# Patient Record
Sex: FEMALE | Race: WHITE
[De-identification: names, ages, dates, MRNs, and addresses within clinical notes are randomized per-mention and may not be internally consistent; named-entity substitution may affect disease eponyms.]

---

## 2017-03-17 ENCOUNTER — HOSPITAL ENCOUNTER (OUTPATIENT)
Dept: HOSPITAL 45 - C.MAMM | Age: 52
Discharge: HOME | End: 2017-03-17
Attending: OBSTETRICS & GYNECOLOGY
Payer: COMMERCIAL

## 2017-03-17 DIAGNOSIS — Z12.31: Primary | ICD-10-CM

## 2017-03-17 NOTE — MAMMOGRAPHY REPORT
BILATERAL DIGITAL SCREENING MAMMOGRAM TOMOSYNTHESIS WITH CAD: 3/17/2017

CLINICAL HISTORY: Routine screening.  Patient has no complaints.  





TECHNIQUE:  Breast tomosynthesis in addition to standard 2D mammography was performed. Current study
 was also evaluated with a Computer Aided Detection (CAD) system.  



COMPARISON: Comparison is made to exams dated:  3/14/2016 mammogram, 3/5/2014 mammogram, 3/11/2015 m
ammogram, 2/27/2013 mammogram, 2/22/2012 mammogram, and 2/18/2011 mammogram - [facility].   



BREAST COMPOSITION:  The tissue of both breasts is heterogeneously dense, which may obscure small ma
sses.  



FINDINGS:  No suspicious masses, calcifications, or areas of architectural distortion are noted in e
ither breast. There has been no significant interval change compared to prior exams.  





IMPRESSION:  ACR BI-RADS CATEGORY 1: NEGATIVE

There is no mammographic evidence of malignancy. A 1 year screening mammogram is recommended.  The p
atient will receive written notification of the results.  





Approximately 10% of breast cancers are not detected with mammography. A negative mammographic repor
t should not delay biopsy if a clinically suggestive mass is present.



Sahara Melvin M.D.          

ah/:3/17/2017 14:01:36  



Imaging Technologist: Jazmin DUMONT(R)(M), [facility]

letter sent: Normal 1/2  

BI-RADS Code: ACR BI-RADS Category 1: Negative

## 2018-03-20 ENCOUNTER — HOSPITAL ENCOUNTER (OUTPATIENT)
Dept: HOSPITAL 45 - C.MAMM | Age: 53
Discharge: HOME | End: 2018-03-20
Attending: PHYSICIAN ASSISTANT
Payer: COMMERCIAL

## 2018-03-20 DIAGNOSIS — Z12.31: Primary | ICD-10-CM

## 2018-03-20 NOTE — MAMMOGRAPHY REPORT
BILATERAL DIGITAL SCREENING MAMMOGRAM TOMOSYNTHESIS WITH CAD: 3/20/2018

CLINICAL HISTORY: Routine screening.  Patient has no complaints.  





TECHNIQUE:  Breast tomosynthesis in addition to standard 2D mammography was performed. Current study 
was also evaluated with a Computer Aided Detection (CAD) system.  



COMPARISON: Comparison is made to exams dated:  3/17/2017 mammogram, 3/14/2016 mammogram, 3/11/2015 m
ammogram, 3/5/2014 mammogram, 2/27/2013 mammogram, and 2/22/2012 mammogram - [facility]
nter.   



BREAST COMPOSITION:  The tissue of both breasts is heterogeneously dense, which may obscure small mas
ses.  



FINDINGS:  No suspicious masses, calcifications, or areas of architectural distortion are noted in ei
ther breast. There has been no significant interval change compared to prior exams.  



IMPRESSION:  ACR BI-RADS CATEGORY 1: NEGATIVE

There is no mammographic evidence of malignancy. A 1 year screening mammogram is recommended.  The pa
tient will receive written notification of the results.  





Approximately 10% of breast cancers are not detected with mammography. A negative mammographic report
 should not delay biopsy if a clinically suggestive mass is present.



Sahara Melvin M.D.          

/:3/20/2018 10:13:02  



Imaging Technologist: Kianna DUMONT([place])([place]), [facility]

letter sent: Normal 1/2  

BI-RADS Code: ACR BI-RADS Category 1: Negative

## 2018-05-01 ENCOUNTER — HOSPITAL ENCOUNTER (EMERGENCY)
Dept: HOSPITAL 45 - C.EDB | Age: 53
LOS: 1 days | Discharge: HOME | End: 2018-05-02
Payer: COMMERCIAL

## 2018-05-01 VITALS
BODY MASS INDEX: 24.11 KG/M2 | WEIGHT: 132.72 LBS | HEIGHT: 62.01 IN | BODY MASS INDEX: 24.11 KG/M2 | WEIGHT: 132.72 LBS | HEIGHT: 62.01 IN

## 2018-05-01 VITALS — TEMPERATURE: 98.24 F

## 2018-05-01 DIAGNOSIS — R10.30: Primary | ICD-10-CM

## 2018-05-01 DIAGNOSIS — Z86.79: ICD-10-CM

## 2018-05-01 DIAGNOSIS — Z87.42: ICD-10-CM

## 2018-05-01 DIAGNOSIS — Z88.8: ICD-10-CM

## 2018-05-01 DIAGNOSIS — R10.815: ICD-10-CM

## 2018-05-01 LAB
ALBUMIN SERPL-MCNC: 4.2 GM/DL (ref 3.4–5)
ALP SERPL-CCNC: 59 U/L (ref 45–117)
ALT SERPL-CCNC: 22 U/L (ref 12–78)
AST SERPL-CCNC: 15 U/L (ref 15–37)
BASOPHILS # BLD: 0.02 K/UL (ref 0–0.2)
BASOPHILS NFR BLD: 0.3 %
BUN SERPL-MCNC: 21 MG/DL (ref 7–18)
CALCIUM SERPL-MCNC: 8.7 MG/DL (ref 8.5–10.1)
CO2 SERPL-SCNC: 30 MMOL/L (ref 21–32)
CREAT SERPL-MCNC: 0.92 MG/DL (ref 0.6–1.2)
EOS ABS #: 0.16 K/UL (ref 0–0.5)
EOSINOPHIL NFR BLD AUTO: 265 K/UL (ref 130–400)
GLUCOSE SERPL-MCNC: 94 MG/DL (ref 70–99)
HCT VFR BLD CALC: 37.1 % (ref 37–47)
HGB BLD-MCNC: 12.6 G/DL (ref 12–16)
IG#: 0.01 K/UL (ref 0–0.02)
IMM GRANULOCYTES NFR BLD AUTO: 36.3 %
LIPASE: 167 U/L (ref 73–393)
LYMPHOCYTES # BLD: 2.28 K/UL (ref 1.2–3.4)
MCH RBC QN AUTO: 31.5 PG (ref 25–34)
MCHC RBC AUTO-ENTMCNC: 34 G/DL (ref 32–36)
MCV RBC AUTO: 92.8 FL (ref 80–100)
MONO ABS #: 0.55 K/UL (ref 0.11–0.59)
MONOCYTES NFR BLD: 8.8 %
NEUT ABS #: 3.26 K/UL (ref 1.4–6.5)
NEUTROPHILS # BLD AUTO: 2.5 %
NEUTROPHILS NFR BLD AUTO: 51.9 %
PMV BLD AUTO: 9.2 FL (ref 7.4–10.4)
POTASSIUM SERPL-SCNC: 4.1 MMOL/L (ref 3.5–5.1)
PROT SERPL-MCNC: 7.5 GM/DL (ref 6.4–8.2)
RED CELL DISTRIBUTION WIDTH CV: 12.5 % (ref 11.5–14.5)
RED CELL DISTRIBUTION WIDTH SD: 42.9 FL (ref 36.4–46.3)
SODIUM SERPL-SCNC: 140 MMOL/L (ref 136–145)
WBC # BLD AUTO: 6.28 K/UL (ref 4.8–10.8)

## 2018-05-01 NOTE — DIAGNOSTIC IMAGING REPORT
ABDOMEN AND PELVIS CT WITH IV CONTRAST



CT DOSE: 260.83 mGy.cm



HISTORY: Acute right lower quadrant abdominal pain  abd pain, rlq tenderness



TECHNIQUE: Multiaxial CT images of the abdomen and pelvis were performed

following the use of intravenous contrast.  A dose lowering technique was

utilized adhering to the principles of ALARA.



COMPARISON STUDY: None.



FINDINGS: 

Linear subsegmental consolidative opacities of the right middle lobe and

inferior segment lingula suggest atelectasis or scarring. There is no

pneumatosis or pneumoperitoneum. The imaged inferior cardiac chambers are

unremarkable. Trace pericardial effusion. Coronary arterial calcifications are

noted.



Mildly contracted gallbladder. Liver, spleen, pancreas and adrenal glands are

within normal limits. Kidneys, ureters and bladder are unremarkable. Uterus and

adnexa are unremarkable. Mild mixed plaquing of the aorta without aneurysm. No

bulky adenopathy.



Small sliding-type hiatal hernia. No small bowel obstruction. Normal appendix is

seen on image 295 series 3. Mobile cecum is noted within the left lower quadrant

of the abdomen. Mild colonic diverticulosis without CT evidence of acute

diverticulitis. Moderate stool volume suggests constipation. Fecal debris is

also noted within the terminal ileum. Soft tissues are unremarkable. Bones

appear intact. Transitional lumbosacral anatomy with lumbarization of the S1

segment. Rudimentary disc space is seen at S1-S2.



IMPRESSION: 



1. No acute intra-abdominal or intrapelvic abnormality identified . Normal

appendix.

2. No bowel obstruction or focal bowel wall thickening.

3. Suggested constipation.

4. Transitional lumbosacral anatomy with lumbarization of the S1 segment.

5. Colonic diverticulosis without CT evidence of acute diverticulitis.







Electronically signed by:  Andrews Lopez M.D.

5/1/2018 10:40 PM



Dictated Date/Time:  5/1/2018 10:33 PM

## 2018-05-01 NOTE — EMERGENCY ROOM VISIT NOTE
History


Report prepared by Kareem:  Erik Springer


Under the Supervision of:  Dr. Dorothea Person D.O.


First contact with patient:  21:19


Chief Complaint:  ABDOMINAL PAIN


Stated Complaint:  STOMACH PAINS


Nursing Triage Summary:  


Patient ambulatory to triage with an upright and steady gait, states "I have 


stomach pain, epigastric, that started around 1630 this evening. It subsided a 


little. I ate dinner. The pain got really bad around 1915."





History of Present Illness


The patient is a 52 year old female who presents to the Emergency Room with 

complaints of waxing and waning abdominal pain starting around 1600 this 

afternoon which is improved with hunching over. The patient states that she had 

a bottle of hard cider and hummus chips, and then 30 minutes later she started 

to have central abdominal pain which worsened around 1900 after having dinner. 

The patient states that the pain does not radiate into her back, it does not 

really move anywhere, and she does not feel bloated or distended. She denies 

any nausea, vomiting, fevers, chills, diarrhea, bloody stools, black stools, 

urinary symptoms, recent change in activity, recent travel, recent medication 

changes, any sick contacts, and any change in her eating other than an apple 

today. The patient has a history of IBS and diverticulosis, and she states that 

she has no history of diabetes, hypertension, abdominal surgery, and any blood 

thinner use. The patient has a family history of colon cancer, and she had a 

colonoscopy a year ago. She additionally notes that she had a dull ache on the 

right side of her abdomen for the past week which was a possible muscle strain, 

though she states that this improved until today. The patient reports that she 

still has her appendix and her gall bladder.





   Source of History:  patient


   Onset:  1600


   Position:  abdomen


   Timing:  waxes/wanes


   Modifying Factors (Worsening):  other (hunching over)


   Associated Symptoms:  No fevers, No chills, No nausea, No vomiting, No 

diarrhea, No urinary symptoms





Review of Systems


See HPI for pertinent positives & negatives. A total of 10 systems reviewed and 

were otherwise negative.





Past Medical & Surgical


Medical Problems:


(1) Cardiac Murmurs Nec


(2) Menstrual Disorder Nos


(3) Urin Tract Infection Nos








Family History





No significant family history





Social History


Smoking Status:  Never Smoker


Alcohol Use:  occasionally


Marital Status:  


Housing Status:  lives with significant other





Current/Historical Medications


Scheduled


Calcium Phosphate-Cholecalcife (Caltrate Gummy Bites), 2 TABS PO DAILY


Coenzyme Q10 (Ubidecarenone) (Co Q 10), 100 MG PO DAILY


Dicyclomine Hcl (Bentyl), 20 MG PO TID


Fiber (Fiber Select Gummies), 1 TAB PO DAILY


Fish Oil (Cherokee-3), 1 CAP PO DAILY


Loratadine (Claritin), 10 MG PO DAILY


Probiotic Product (Probiotic), 1 TAB PO DAILY


Red Yeast Rice Extract (Red Yeast Rice), 600 MG PO DAILY





Allergies


Coded Allergies:  


     Terbinafine (Verified  Allergy, Intermediate, Hives, 5/1/18)





Physical Exam


Vital Signs











  Date Time  Temp Pulse Resp B/P (MAP) Pulse Ox O2 Delivery O2 Flow Rate FiO2


 


5/2/18 00:23  89 18 148/96 98   


 


5/1/18 23:59  89 18 148/96 98 Room Air  


 


5/1/18 21:55  87 18 147/98 98 Room Air  


 


5/1/18 20:42 36.8 89 20 152/97 98 Room Air  











Physical Exam


GENERAL: alert, well appearing, well nourished, no distress, non-toxic 


EYE EXAM: normal conjunctiva, PERRL and EOM's grossly intact


OROPHARYNX: no exudate, no erythema, lips, buccal mucosa, and tongue normal and 

mucous membranes are moist


NECK: supple, no nuchal rigidity, no adenopathy, non-tender


LUNGS: Clear to auscultation. Normal chest wall mechanics


HEART: no murmurs, S1 normal and S2 normal 


ABDOMEN: abdomen soft, central abdominal tenderness and right lower quadrant 

tenderness, normo-active bowel sounds, no masses, no rebound or guarding. 


BACK: Back is symmetrical on inspection and there is no deformity, no midline 

tenderness, no CVA tenderness. 


SKIN: no rashes and no bruising 


UPPER EXTREMITIES: upper extremities are grossly normal. 


LOWER EXTREMITIES: No pitting edema.


NEURO EXAM: Normal sensorium, cranial nerves II-XII grossly intact, normal 

speech,  no gross weakness of arms, no gross weakness of legs.





Medical Decision & Procedures


ER Provider


Diagnostic Interpretation:


Radiology results have been interpreted by the radiologist and reviewed by me.








ABDOMEN AND PELVIS CT WITH IV CONTRAST





CT DOSE: 260.83 mGy.cm





HISTORY: Acute right lower quadrant abdominal pain  abd pain, rlq tenderness





TECHNIQUE: Multiaxial CT images of the abdomen and pelvis were performed


following the use of intravenous contrast.  A dose lowering technique was


utilized adhering to the principles of ALARA.





COMPARISON STUDY: None.





FINDINGS: 


Linear subsegmental consolidative opacities of the right middle lobe and


inferior segment lingula suggest atelectasis or scarring. There is no


pneumatosis or pneumoperitoneum. The imaged inferior cardiac chambers are


unremarkable. Trace pericardial effusion. Coronary arterial calcifications are


noted.





Mildly contracted gallbladder. Liver, spleen, pancreas and adrenal glands are


within normal limits. Kidneys, ureters and bladder are unremarkable. Uterus and


adnexa are unremarkable. Mild mixed plaquing of the aorta without aneurysm. No


bulky adenopathy.





Small sliding-type hiatal hernia. No small bowel obstruction. Normal appendix is


seen on image 295 series 3. Mobile cecum is noted within the left lower quadrant


of the abdomen. Mild colonic diverticulosis without CT evidence of acute


diverticulitis. Moderate stool volume suggests constipation. Fecal debris is


also noted within the terminal ileum. Soft tissues are unremarkable. Bones


appear intact. Transitional lumbosacral anatomy with lumbarization of the S1


segment. Rudimentary disc space is seen at S1-S2.





IMPRESSION: 





1. No acute intra-abdominal or intrapelvic abnormality identified . Normal


appendix.


2. No bowel obstruction or focal bowel wall thickening.


3. Suggested constipation.


4. Transitional lumbosacral anatomy with lumbarization of the S1 segment.


5. Colonic diverticulosis without CT evidence of acute diverticulitis.





Electronically signed by:  Andrews Lopez M.D.


5/1/2018 10:40 PM





Dictated Date/Time:  5/1/2018 10:33 PM





Laboratory Results


5/1/18 21:20








Red Blood Count 4.00, Mean Corpuscular Volume 92.8, Mean Corpuscular Hemoglobin 

31.5, Mean Corpuscular Hemoglobin Concent 34.0, Mean Platelet Volume 9.2, 

Neutrophils (%) (Auto) 51.9, Lymphocytes (%) (Auto) 36.3, Monocytes (%) (Auto) 

8.8, Eosinophils (%) (Auto) 2.5, Basophils (%) (Auto) 0.3, Neutrophils # (Auto) 

3.26, Lymphocytes # (Auto) 2.28, Monocytes # (Auto) 0.55, Eosinophils # (Auto) 

0.16, Basophils # (Auto) 0.02





5/1/18 21:20

















Test


  5/1/18


21:20


 


White Blood Count


  6.28 K/uL


(4.8-10.8)


 


Red Blood Count


  4.00 M/uL


(4.2-5.4)


 


Hemoglobin


  12.6 g/dL


(12.0-16.0)


 


Hematocrit 37.1 % (37-47) 


 


Mean Corpuscular Volume


  92.8 fL


()


 


Mean Corpuscular Hemoglobin


  31.5 pg


(25-34)


 


Mean Corpuscular Hemoglobin


Concent 34.0 g/dl


(32-36)


 


Platelet Count


  265 K/uL


(130-400)


 


Mean Platelet Volume


  9.2 fL


(7.4-10.4)


 


Neutrophils (%) (Auto) 51.9 % 


 


Lymphocytes (%) (Auto) 36.3 % 


 


Monocytes (%) (Auto) 8.8 % 


 


Eosinophils (%) (Auto) 2.5 % 


 


Basophils (%) (Auto) 0.3 % 


 


Neutrophils # (Auto)


  3.26 K/uL


(1.4-6.5)


 


Lymphocytes # (Auto)


  2.28 K/uL


(1.2-3.4)


 


Monocytes # (Auto)


  0.55 K/uL


(0.11-0.59)


 


Eosinophils # (Auto)


  0.16 K/uL


(0-0.5)


 


Basophils # (Auto)


  0.02 K/uL


(0-0.2)


 


RDW Standard Deviation


  42.9 fL


(36.4-46.3)


 


RDW Coefficient of Variation


  12.5 %


(11.5-14.5)


 


Immature Granulocyte % (Auto) 0.2 % 


 


Immature Granulocyte # (Auto)


  0.01 K/uL


(0.00-0.02)


 


Urine Color YELLOW 


 


Urine Appearance CLEAR (CLEAR) 


 


Urine pH 6.5 (4.5-7.5) 


 


Urine Specific Gravity


  1.011


(1.000-1.030)


 


Urine Protein NEG (NEG) 


 


Urine Glucose (UA) NEG (NEG) 


 


Urine Ketones NEG (NEG) 


 


Urine Occult Blood NEG (NEG) 


 


Urine Nitrite NEG (NEG) 


 


Urine Bilirubin NEG (NEG) 


 


Urine Urobilinogen NEG (NEG) 


 


Urine Leukocyte Esterase NEG (NEG) 


 


Anion Gap


  5.0 mmol/L


(3-11)


 


Est Creatinine Clear Calc


Drug Dose 60.5 ml/min 


 


 


Estimated GFR (


American) 82.4 


 


 


Estimated GFR (Non-


American 71.1 


 


 


BUN/Creatinine Ratio 22.8 (10-20) 


 


Calcium Level


  8.7 mg/dl


(8.5-10.1)


 


Total Bilirubin


  0.3 mg/dl


(0.2-1)


 


Aspartate Amino Transf


(AST/SGOT) 15 U/L (15-37) 


 


 


Alanine Aminotransferase


(ALT/SGPT) 22 U/L (12-78) 


 


 


Alkaline Phosphatase


  59 U/L


()


 


Troponin I


  < 0.015 ng/ml


(0-0.045)


 


Total Protein


  7.5 gm/dl


(6.4-8.2)


 


Albumin


  4.2 gm/dl


(3.4-5.0)


 


Globulin


  3.3 gm/dl


(2.5-4.0)


 


Albumin/Globulin Ratio 1.3 (0.9-2) 


 


Lipase


  167 U/L


()








Laboratory results per my review.





Medications Administered











 Medications


  (Trade)  Dose


 Ordered  Sig/Luisa


 Route  Start Time


 Stop Time Status Last Admin


Dose Admin


 


 Dicyclomine HCl


  (Bentyl Tab)  20 mg  NOW  STAT


 PO  5/1/18 23:36


 5/1/18 23:37 DC 5/1/18 23:58


20 MG











ED Course


2119: The patient was evaluated in room B5. A complete history and physical 

exam was performed. 





2331: Upon reevaluation, the patient is feeling better, though her pain is now 

lower and all the way across her abdomen. I discussed the findings and the 

treatment plan with the patient.  She verbalizes agreement and understanding.  

She was discharged home.





Medical Decision


Differential diagnosis:


Etiologies such as appendicitis, diverticulitis, PUD, biliary pathology, UTI, 

pancreatitis, obstruction, mesenteric ischemia, aortic pathology, infections, 

inflammatory bowel disease, renal colic, as well as others were entertained. 





Discussed with patient possible differential diagnosis of her pain.  Discussed 

with her all results including labs and imaging.  No evidence of acute 

appendicitis, acute cholecystitis, colitis, bowel obstruction, ureterolithiasis

, or acute vascular pathology noted.  I do not suspect bacteremia/sepsis.  

Patient's history not consistent with GI bleed.  Discussed with her possible 

viral syndrome, food borne illness, biliary colic.  Discussed symptoms to watch 

and return for, diet and hydration, she verbalized understanding was agreeable 

with plan.  Patient hemodynamically stable throughout.  Patient not 

immunocompromised and no other significant past medical history.  Patient well-

appearing here was able to tolerate p.o. and felt her pain was improved by time 

of discharge.





Medication Reconcilliation


Current Medication List:  was personally reviewed by me





Blood Pressure Screening


Patient's blood pressure:  Elevated blood pressure


Blood pressure disposition:  Elevated BP felt to be situational





Impression





 Primary Impression:  


 Abdominal pain





Scribe Attestation


The scribe's documentation has been prepared under my direction and personally 

reviewed by me in its entirety. I confirm that the note above accurately 

reflects all work, treatment, procedures, and medical decision making performed 

by me.





Departure Information


Dispostion


Home / Self-Care





Prescriptions





Dicyclomine Hcl (BENTYL) 10 Mg Cap


20 MG PO TID for Pain, #20 CAP


   Prov: Dorothea Person,          5/2/18





Referrals


Toi Chan M.D. (PCP)





Forms


Call Back Authorization, HOME CARE DOCUMENTATION FORM,                         

                                       IMPORTANT VISIT INFORMATION





Patient Instructions


My [facility]





Additional Instructions





Please eat a light and bland diet until you are feeling better.  Please drink 

plenty of clear liquids to stay well-hydrated.  Please continue your regular 

medications as prescribed.  If you have any worsening pain, develop vomiting, 

diarrhea, noticed black or bloody stools, develop fevers or chills, dizziness, 

difficulty urinating, you have any other new concerns, please return the 

emergency room.





Problem Qualifiers








 Primary Impression:  


 Abdominal pain


 Abdominal location:  unspecified location  Qualified Codes:  R10.9 - 

Unspecified abdominal pain

## 2018-05-02 VITALS — DIASTOLIC BLOOD PRESSURE: 96 MMHG | OXYGEN SATURATION: 98 % | SYSTOLIC BLOOD PRESSURE: 148 MMHG | HEART RATE: 89 BPM
